# Patient Record
Sex: MALE | NOT HISPANIC OR LATINO | Employment: OTHER | URBAN - METROPOLITAN AREA
[De-identification: names, ages, dates, MRNs, and addresses within clinical notes are randomized per-mention and may not be internally consistent; named-entity substitution may affect disease eponyms.]

---

## 2023-08-11 ENCOUNTER — LAB (OUTPATIENT)
Dept: LAB | Facility: LAB | Age: 53
End: 2023-08-11
Payer: COMMERCIAL

## 2023-08-11 ENCOUNTER — OFFICE VISIT (OUTPATIENT)
Dept: PRIMARY CARE | Facility: CLINIC | Age: 53
End: 2023-08-11
Payer: COMMERCIAL

## 2023-08-11 VITALS
SYSTOLIC BLOOD PRESSURE: 119 MMHG | HEART RATE: 85 BPM | WEIGHT: 170 LBS | HEIGHT: 76 IN | BODY MASS INDEX: 20.7 KG/M2 | DIASTOLIC BLOOD PRESSURE: 77 MMHG | OXYGEN SATURATION: 97 %

## 2023-08-11 DIAGNOSIS — Z12.5 PROSTATE CANCER SCREENING: ICD-10-CM

## 2023-08-11 DIAGNOSIS — Z76.89 ESTABLISHING CARE WITH NEW DOCTOR, ENCOUNTER FOR: ICD-10-CM

## 2023-08-11 DIAGNOSIS — Z00.00 WELLNESS EXAMINATION: ICD-10-CM

## 2023-08-11 DIAGNOSIS — E55.9 VITAMIN D DEFICIENCY: ICD-10-CM

## 2023-08-11 DIAGNOSIS — Z00.00 WELLNESS EXAMINATION: Primary | ICD-10-CM

## 2023-08-11 DIAGNOSIS — Z12.11 COLON CANCER SCREENING: ICD-10-CM

## 2023-08-11 PROBLEM — Z85.068 HISTORY OF MALIGNANT NEOPLASM OF SMALL INTESTINE: Status: ACTIVE | Noted: 2023-08-11

## 2023-08-11 LAB
ALANINE AMINOTRANSFERASE (SGPT) (U/L) IN SER/PLAS: 14 U/L (ref 10–52)
ALBUMIN (G/DL) IN SER/PLAS: 4.7 G/DL (ref 3.4–5)
ALKALINE PHOSPHATASE (U/L) IN SER/PLAS: 76 U/L (ref 33–120)
ANION GAP IN SER/PLAS: 13 MMOL/L (ref 10–20)
ASPARTATE AMINOTRANSFERASE (SGOT) (U/L) IN SER/PLAS: 14 U/L (ref 9–39)
BILIRUBIN TOTAL (MG/DL) IN SER/PLAS: 0.5 MG/DL (ref 0–1.2)
CALCIUM (MG/DL) IN SER/PLAS: 9.3 MG/DL (ref 8.6–10.3)
CARBON DIOXIDE, TOTAL (MMOL/L) IN SER/PLAS: 27 MMOL/L (ref 21–32)
CHLORIDE (MMOL/L) IN SER/PLAS: 104 MMOL/L (ref 98–107)
CHOLESTEROL (MG/DL) IN SER/PLAS: 171 MG/DL (ref 0–199)
CHOLESTEROL IN HDL (MG/DL) IN SER/PLAS: 32.8 MG/DL
CHOLESTEROL/HDL RATIO: 5.2
CREATININE (MG/DL) IN SER/PLAS: 1.29 MG/DL (ref 0.5–1.3)
ERYTHROCYTE DISTRIBUTION WIDTH (RATIO) BY AUTOMATED COUNT: 12.4 % (ref 11.5–14.5)
ERYTHROCYTE MEAN CORPUSCULAR HEMOGLOBIN CONCENTRATION (G/DL) BY AUTOMATED: 31.9 G/DL (ref 32–36)
ERYTHROCYTE MEAN CORPUSCULAR VOLUME (FL) BY AUTOMATED COUNT: 87 FL (ref 80–100)
ERYTHROCYTES (10*6/UL) IN BLOOD BY AUTOMATED COUNT: 5.28 X10E12/L (ref 4.5–5.9)
GFR MALE: 66 ML/MIN/1.73M2
GLUCOSE (MG/DL) IN SER/PLAS: 71 MG/DL (ref 74–99)
HEMATOCRIT (%) IN BLOOD BY AUTOMATED COUNT: 46.1 % (ref 41–52)
HEMOGLOBIN (G/DL) IN BLOOD: 14.7 G/DL (ref 13.5–17.5)
LEUKOCYTES (10*3/UL) IN BLOOD BY AUTOMATED COUNT: 5.4 X10E9/L (ref 4.4–11.3)
NON-HDL CHOLESTEROL: 138 MG/DL
PLATELETS (10*3/UL) IN BLOOD AUTOMATED COUNT: 259 X10E9/L (ref 150–450)
POTASSIUM (MMOL/L) IN SER/PLAS: 4.4 MMOL/L (ref 3.5–5.3)
PROTEIN TOTAL: 6.1 G/DL (ref 6.4–8.2)
SODIUM (MMOL/L) IN SER/PLAS: 140 MMOL/L (ref 136–145)
THYROTROPIN (MIU/L) IN SER/PLAS BY DETECTION LIMIT <= 0.05 MIU/L: 0.89 MIU/L (ref 0.44–3.98)
UREA NITROGEN (MG/DL) IN SER/PLAS: 17 MG/DL (ref 6–23)

## 2023-08-11 PROCEDURE — 84153 ASSAY OF PSA TOTAL: CPT

## 2023-08-11 PROCEDURE — 82306 VITAMIN D 25 HYDROXY: CPT

## 2023-08-11 PROCEDURE — 85027 COMPLETE CBC AUTOMATED: CPT

## 2023-08-11 PROCEDURE — 36415 COLL VENOUS BLD VENIPUNCTURE: CPT

## 2023-08-11 PROCEDURE — 1036F TOBACCO NON-USER: CPT

## 2023-08-11 PROCEDURE — 82465 ASSAY BLD/SERUM CHOLESTEROL: CPT

## 2023-08-11 PROCEDURE — 80053 COMPREHEN METABOLIC PANEL: CPT

## 2023-08-11 PROCEDURE — 99396 PREV VISIT EST AGE 40-64: CPT

## 2023-08-11 PROCEDURE — 84443 ASSAY THYROID STIM HORMONE: CPT

## 2023-08-11 PROCEDURE — 83718 ASSAY OF LIPOPROTEIN: CPT

## 2023-08-11 ASSESSMENT — ENCOUNTER SYMPTOMS
FATIGUE: 0
JOINT SWELLING: 0
FREQUENCY: 0
HEADACHES: 0
SORE THROAT: 0
RHINORRHEA: 0
WEAKNESS: 0
FEVER: 0
EYE DISCHARGE: 0
CONSTIPATION: 0
SLEEP DISTURBANCE: 0
PALPITATIONS: 0
EYE PAIN: 0
HYPERACTIVE: 0
WOUND: 0
NECK PAIN: 0
SINUS PAIN: 0
VOICE CHANGE: 0
SHORTNESS OF BREATH: 0
DIZZINESS: 0
AGITATION: 0
MYALGIAS: 0
COUGH: 0
EYE ITCHING: 0
SINUS PRESSURE: 0
DYSPHORIC MOOD: 0
BLOOD IN STOOL: 0
ABDOMINAL PAIN: 0
CHEST TIGHTNESS: 0
NUMBNESS: 0
DIARRHEA: 0
VOMITING: 0
SPEECH DIFFICULTY: 0
NERVOUS/ANXIOUS: 0
HEMATURIA: 0
WHEEZING: 0
DIFFICULTY URINATING: 0
BACK PAIN: 0

## 2023-08-11 ASSESSMENT — PATIENT HEALTH QUESTIONNAIRE - PHQ9
2. FEELING DOWN, DEPRESSED OR HOPELESS: NOT AT ALL
1. LITTLE INTEREST OR PLEASURE IN DOING THINGS: NOT AT ALL
SUM OF ALL RESPONSES TO PHQ9 QUESTIONS 1 AND 2: 0

## 2023-08-11 NOTE — PATIENT INSTRUCTIONS
Please get fasting labs done in the next few DAYS (nothing to eat or drink for 10 hours prior to blood draw EXCEPT black coffee and water), we will call you with the results. If you do not hear from up 2-3 business days after you had your labs drawn, please call the office at [phone]    Vaccines are important!  Yearly seasonal flu shots are recommended, high dose is indicated for patient over 65.   - Tetanus boosters should be given every 10 yrs, this also covers for diphtheria and pertussis.   - most insurances cover the 2-shot series for shingles (shingrix) after the age of 60.   - Pneumonia vaccines are given routinely at age 65, however is you have a chronic heart or lung diagnosis this may be given earlier.     Preventative cancer screens SAVE LIVES!  - Prostate cancer screening is included in yearly blood work in men over 40.   - Colon cancer screening is recommended at age  for all patients, sometimes sooner based on family history.   - other tests may be recommended if you are a smoker!     150 mins of aerobic exercise is advised for good cardiovascular health and maintain a healthy weight.     Please try to work on maintaining a healthy body weight, with a BMI close to or at a BMI 19-25.    Recommend a whole-foods, plant-based diet, filled with fresh fruits, vegetables, seeds, beans, nuts and berries.    Discussed smoking cessation/Abstinence is best.      Abstaining from the use of alcohol is best. However if you choose to drink current guidelines are 2 or less drinks per day for men and 1.5 or less per day for women (and not all saved for one night on the weekend).    Your blood pressure should be BELOW 135/85.  If your readings were high today, please consider an at home blood pressure monitor to keep a log to bring with you to your next appointment.     OF course, do not text and drive and always wear a seat belt.

## 2023-08-11 NOTE — PROGRESS NOTES
Subjective   Patient ID: Pramod Viera is a 52 y.o. male who presents for Establish Care (Pramod is here today to establish care ).    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  Reports overall health is well/at baseline with no complaints, he is able to live independently is well adapted to his environment and plays golf daily weather permitting.     Do you take any herbs or supplements that were not prescribed by a doctor? no  Are you taking calcium supplements? no  Are you taking aspirin daily? 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 no    1. Patient Counseling:  --Nutrition: Stressed importance of moderation in sodium/caffeine intake, saturated fat and cholesterol, caloric balance, sufficient intake of fresh fruits, vegetables, fiber, calcium, iron, and 1 mg of folate supplement per day (for females capable of pregnancy).  --Discussed the issue of estrogen replacement, calcium supplement, and the daily use of baby aspirin.  --Exercise: Stressed the importance of regular exercise.   --Substance Abuse: Discussed cessation/primary prevention of tobacco, alcohol, or other drug use; driving or other dangerous activities under the influence; availability of treatment for abuse.    --Sexuality: Discussed sexually transmitted diseases, partner selection, use of condoms, avoidance of unintended pregnancy  and contraceptive alternatives.   --Injury prevention: Discussed safety belts, safety helmets, smoke detector, smoking near bedding or upholstery.   --Dental health: Discussed importance of regular tooth brushing, flossing, and dental visits.  --Immunizations reviewed.  --Discussed  "benefits of screening colonoscopy.  --After hours service discussed with patient  2 Discussed the patient's BMI with her.  The BMI is in the acceptable range.  3 Follow up as needed for acute illness             Review of Systems   Constitutional:  Negative for fatigue and fever.   HENT:  Negative for congestion, ear discharge, ear pain, nosebleeds, postnasal drip, rhinorrhea, sinus pressure, sinus pain, sore throat, tinnitus and voice change.    Eyes:  Negative for pain, discharge and itching.   Respiratory:  Negative for cough, chest tightness, shortness of breath and wheezing.    Cardiovascular:  Negative for chest pain, palpitations and leg swelling.   Gastrointestinal:  Negative for abdominal pain, blood in stool, constipation, diarrhea and vomiting.   Endocrine: Negative for cold intolerance, heat intolerance and polyuria.   Genitourinary:  Negative for difficulty urinating, frequency, hematuria, penile pain, penile swelling, scrotal swelling, testicular pain and urgency.   Musculoskeletal:  Negative for back pain, gait problem, joint swelling, myalgias and neck pain.   Skin:  Negative for rash and wound.   Neurological:  Negative for dizziness, speech difficulty, weakness, numbness and headaches.   Psychiatric/Behavioral:  Negative for agitation, dysphoric mood and sleep disturbance. The patient is not nervous/anxious and is not hyperactive.        Objective   /77   Pulse 85   Ht 1.93 m (6' 4\")   Wt 77.1 kg (170 lb)   SpO2 97%   BMI 20.69 kg/m²     Physical Exam  Vitals and nursing note reviewed. Exam conducted with a chaperone present.   Constitutional:       Appearance: Normal appearance.   HENT:      Head: Normocephalic and atraumatic.      Right Ear: Tympanic membrane and external ear normal.      Left Ear: Tympanic membrane and external ear normal.      Nose: Nose normal.      Mouth/Throat:      Mouth: Mucous membranes are moist.      Pharynx: Oropharynx is clear. Uvula midline.   Eyes:      " General: Lids are normal.      Extraocular Movements: Extraocular movements intact.      Pupils: Pupils are equal, round, and reactive to light.   Neck:      Thyroid: No thyromegaly or thyroid tenderness.   Cardiovascular:      Rate and Rhythm: Normal rate and regular rhythm.      Heart sounds: Normal heart sounds.   Pulmonary:      Effort: Pulmonary effort is normal.      Breath sounds: Normal breath sounds.   Abdominal:      General: Bowel sounds are normal.      Palpations: Abdomen is soft.      Tenderness: There is no abdominal tenderness. There is no guarding.   Musculoskeletal:         General: No swelling. Normal range of motion.      Cervical back: Normal range of motion.      Right lower leg: No edema.      Left lower leg: No edema.   Lymphadenopathy:      Head:      Right side of head: No submental, submandibular or tonsillar adenopathy.      Left side of head: No submental, submandibular or tonsillar adenopathy.      Cervical: No cervical adenopathy.   Skin:     General: Skin is warm and dry.      Capillary Refill: Capillary refill takes less than 2 seconds.      Coloration: Skin is not jaundiced.      Findings: No lesion or rash.   Neurological:      Mental Status: He is alert and oriented to person, place, and time. Mental status is at baseline.      Comments: Some residual both expressive and receptive asphasia post CVA   Psychiatric:         Mood and Affect: Mood normal.         Behavior: Behavior normal.         Thought Content: Thought content normal.         Judgment: Judgment normal.         Assessment/Plan   Problem List Items Addressed This Visit    None  Visit Diagnoses       Wellness examination    -  Primary    Relevant Orders    CBC    Comprehensive Metabolic Panel    TSH with reflex to Free T4 if abnormal    Lipid Panel Non-Fasting    Establishing care with new doctor, encounter for        Prostate cancer screening        Relevant Orders    Prostate Specific Antigen, Screen    Colon cancer  screening        Relevant Orders    Cologuard® colon cancer screening    Vitamin D deficiency        Relevant Orders    Vitamin D, Total          Per pt when results of labs need communicated to call his sister or her  ron as phone communication is too difficult.

## 2023-08-12 LAB
CALCIDIOL (25 OH VITAMIN D3) (NG/ML) IN SER/PLAS: 41 NG/ML
PROSTATE SPECIFIC ANTIGEN,SCREEN: 1.79 NG/ML (ref 0–4)

## 2023-11-07 ENCOUNTER — TELEPHONE (OUTPATIENT)
Dept: PRIMARY CARE | Facility: CLINIC | Age: 53
End: 2023-11-07
Payer: COMMERCIAL

## 2023-11-07 NOTE — TELEPHONE ENCOUNTER
Called pt regarding Cologaurd needing to be completed that was ordered 8/11/2023  Sister said she has kit and will try to get him to complete.

## 2025-01-15 ENCOUNTER — APPOINTMENT (OUTPATIENT)
Dept: PRIMARY CARE | Facility: CLINIC | Age: 55
End: 2025-01-15
Payer: COMMERCIAL